# Patient Record
Sex: FEMALE | Race: WHITE | NOT HISPANIC OR LATINO
[De-identification: names, ages, dates, MRNs, and addresses within clinical notes are randomized per-mention and may not be internally consistent; named-entity substitution may affect disease eponyms.]

---

## 2021-04-29 PROBLEM — Z00.00 ENCOUNTER FOR PREVENTIVE HEALTH EXAMINATION: Status: ACTIVE | Noted: 2021-04-29

## 2021-04-30 ENCOUNTER — APPOINTMENT (OUTPATIENT)
Dept: ENDOCRINOLOGY | Facility: CLINIC | Age: 61
End: 2021-04-30

## 2021-05-24 ENCOUNTER — APPOINTMENT (OUTPATIENT)
Dept: ENDOCRINOLOGY | Facility: CLINIC | Age: 61
End: 2021-05-24
Payer: MEDICARE

## 2021-05-24 VITALS
SYSTOLIC BLOOD PRESSURE: 130 MMHG | OXYGEN SATURATION: 97 % | DIASTOLIC BLOOD PRESSURE: 80 MMHG | WEIGHT: 283 LBS | HEIGHT: 64 IN | HEART RATE: 80 BPM | BODY MASS INDEX: 48.32 KG/M2

## 2021-05-24 DIAGNOSIS — Z78.9 OTHER SPECIFIED HEALTH STATUS: ICD-10-CM

## 2021-05-24 DIAGNOSIS — Z83.3 FAMILY HISTORY OF DIABETES MELLITUS: ICD-10-CM

## 2021-05-24 DIAGNOSIS — Z82.49 FAMILY HISTORY OF ISCHEMIC HEART DISEASE AND OTHER DISEASES OF THE CIRCULATORY SYSTEM: ICD-10-CM

## 2021-05-24 DIAGNOSIS — Z80.8 FAMILY HISTORY OF MALIGNANT NEOPLASM OF OTHER ORGANS OR SYSTEMS: ICD-10-CM

## 2021-05-24 LAB — GLUCOSE BLDC GLUCOMTR-MCNC: 330

## 2021-05-24 PROCEDURE — 82962 GLUCOSE BLOOD TEST: CPT

## 2021-05-24 PROCEDURE — 99204 OFFICE O/P NEW MOD 45 MIN: CPT | Mod: 25

## 2021-05-24 PROCEDURE — 99072 ADDL SUPL MATRL&STAF TM PHE: CPT

## 2021-05-24 RX ORDER — ASPIRIN 81 MG/1
81 TABLET ORAL DAILY
Refills: 0 | Status: ACTIVE | COMMUNITY

## 2021-05-24 RX ORDER — UBIDECARENONE/VIT E ACET 100MG-5
1000 CAPSULE ORAL DAILY
Refills: 0 | Status: ACTIVE | COMMUNITY

## 2021-05-24 RX ORDER — METFORMIN HYDROCHLORIDE 1000 MG/1
1000 TABLET, COATED ORAL
Qty: 180 | Refills: 1 | Status: ACTIVE | COMMUNITY

## 2021-05-24 RX ORDER — GLIPIZIDE 5 MG/1
5 TABLET ORAL TWICE DAILY
Refills: 0 | Status: ACTIVE | COMMUNITY

## 2021-05-24 NOTE — REASON FOR VISIT
[Initial Evaluation] : an initial evaluation [DM Type 2] : DM Type 2 [Other___] : [unfilled] [FreeTextEntry2] : Dr. Altamirano

## 2021-05-24 NOTE — PHYSICAL EXAM
[Alert] : alert [Well Nourished] : well nourished [Obese] : obese [No Acute Distress] : no acute distress [Normal Sclera/Conjunctiva] : normal sclera/conjunctiva [EOMI] : extra ocular movement intact [No LAD] : no lymphadenopathy [Thyroid Not Enlarged] : the thyroid was not enlarged [No Thyroid Nodules] : no palpable thyroid nodules [No Respiratory Distress] : no respiratory distress [No Accessory Muscle Use] : no accessory muscle use [Clear to Auscultation] : lungs were clear to auscultation bilaterally [Normal S1, S2] : normal S1 and S2 [Normal Rate] : heart rate was normal [Regular Rhythm] : with a regular rhythm [No Edema] : no peripheral edema [Pedal Pulses Normal] : the pedal pulses are present [Normal Bowel Sounds] : normal bowel sounds [Not Tender] : non-tender [Soft] : abdomen soft [Normal Gait] : normal gait [No Rash] : no rash [Acanthosis Nigricans] : no acanthosis nigricans [Foot Ulcers] : no foot ulcers [Right Foot Was Examined] : right foot ~C was examined [Left Foot Was Examined] : left foot ~C was examined [Normal] : normal [Diminished Throughout Both Feet] : normal tactile sensation with monofilament testing throughout both feet [No Tremors] : no tremors [Oriented x3] : oriented to person, place, and time [Normal Affect] : the affect was normal [Normal Insight/Judgement] : insight and judgment were intact [Normal Mood] : the mood was normal [de-identified] : posterior neck hump [FreeTextEntry1] : 2nd toe anterior lesion due to shaving

## 2021-05-24 NOTE — ASSESSMENT
[FreeTextEntry1] : 59 y/o obese female with uncontrolled diabetes, Hypercalcemia, Hyperlipidemia and HTN. \par \par Plan: \par Type 2 DM: uncontrolled \par - educated on insulin self administration, action and use of insulin along with injection technique, storage, and sharp disposal \par - start Tresiba 14 units daily - first dose given in office by patient\par sample Tresiba pen given lot# [id number] exp 9/2022\par - continue Metformin, Onglyza and Glipizide \par - discussed the use of GLP-1, reviewed s/e, patient will verify what kind of thyroid cancer her mother had before starting medication \par - increase self blood sugar monitoring to 3 times a day\par - send in logs in 1 week \par - schedule appointment with CDE for diet education \par - keep eye appointment \par \par Obesity: encouraged to increase routine exercise \par - educated on healthy  food choices\par \par Hypercalcemia: check PTH, ionized calcium and serum calcium\par \par Hyperlipidemia: continue statin \par \par HTN: BP acceptable, continue ACE-I and diuretic \par \par RTO in 4-6 weeks  [Importance of Diet and Exercise] : importance of diet and exercise to improve glycemic control, achieve weight loss and improve cardiovascular health [Action and use of Insulin] : action and use of short and long-acting insulin [Self Monitoring of Blood Glucose] : self monitoring of blood glucose [Insulin Self-Administration] : insulin self-administration [Injection Technique, Storage, Sharps Disposal] : injection technique, storage, and sharps disposal [Weight Loss] : weight loss [Diabetic Medications] : Risks and benefits of diabetic medications were discussed

## 2021-05-24 NOTE — HISTORY OF PRESENT ILLNESS
[FreeTextEntry1] : Pt. reports A1C has been rising due to stress with . Her  almost lost his leg to diabetes.\par \par Quality: Type 2 DM\par Severity: uncontrolled \par Duration: over 5 years \par Onset: routine labs\par Modifying Factors: Worse with diet \par Associated Symptoms:\par Family History: Mother - diabetes,  thyroid cancer, Brother- diabetes \par \par Current Regimen:\par Metformin 1000 mg 2 tabs daily \par Onglyza 5 mg daily - DPP 4 \par Glipizide 5 mg daily \par \par \par Self blood sugar monitorin time a day, \par per patient fasting 139-348 \par current \par \par exercise: home motion exercises\par \par Diet: at times has fast food and skips meals \par B- corn muffin, cereal\par L- cottage cheese, turkey, fruit\par D- grilled chicken, vegetable, fruits\par Snacks - sugar free cookies \par \par \par Date of last eye exam: 2 years ago (-) DR\par Date of last foot exam: none, denies neuropathy \par Date of last flu vaccine: \par Date of last Pneumovax: last couple of years \par \par Labs: \par 21\par A1C 12.8%\par calcium 11.1\par cholesterol 195\par \par \par PMH: \par Retired \par obesity\par arthritis \par \par PSH: \par neck surgery due to herniated disc 2019\par left breast cyst\par left overay cyst\par

## 2021-05-24 NOTE — REVIEW OF SYSTEMS
[Fatigue] : fatigue [Recent Weight Loss (___ Lbs)] : recent weight loss: [unfilled] lbs [Polyuria] : polyuria [Anxiety] : anxiety [Stress] : stress [Decreased Appetite] : appetite not decreased [Visual Field Defect] : no visual field defect [Blurred Vision] : no blurred vision [Dysphagia] : no dysphagia [Neck Pain] : no neck pain [Dysphonia] : no dysphonia [Chest Pain] : no chest pain [Palpitations] : no palpitations [Constipation] : no constipation [Diarrhea] : no diarrhea [Dysuria] : no dysuria [Headaches] : no headaches [Tremors] : no tremors [Depression] : no depression [Polydipsia] : no polydipsia [Swelling] : no swelling [FreeTextEntry3] : wears glasses

## 2021-05-25 ENCOUNTER — NON-APPOINTMENT (OUTPATIENT)
Age: 61
End: 2021-05-25

## 2021-06-08 ENCOUNTER — APPOINTMENT (OUTPATIENT)
Dept: ENDOCRINOLOGY | Facility: CLINIC | Age: 61
End: 2021-06-08
Payer: MEDICARE

## 2021-06-08 PROCEDURE — G0108 DIAB MANAGE TRN  PER INDIV: CPT

## 2021-06-08 PROCEDURE — 99072 ADDL SUPL MATRL&STAF TM PHE: CPT

## 2021-06-23 ENCOUNTER — APPOINTMENT (OUTPATIENT)
Dept: ENDOCRINOLOGY | Facility: CLINIC | Age: 61
End: 2021-06-23
Payer: MEDICARE

## 2021-06-23 PROCEDURE — 99072 ADDL SUPL MATRL&STAF TM PHE: CPT

## 2021-06-23 PROCEDURE — G0108 DIAB MANAGE TRN  PER INDIV: CPT

## 2021-06-30 ENCOUNTER — APPOINTMENT (OUTPATIENT)
Dept: ENDOCRINOLOGY | Facility: CLINIC | Age: 61
End: 2021-06-30
Payer: MEDICARE

## 2021-06-30 VITALS
SYSTOLIC BLOOD PRESSURE: 110 MMHG | DIASTOLIC BLOOD PRESSURE: 80 MMHG | HEIGHT: 64 IN | HEART RATE: 74 BPM | WEIGHT: 293 LBS | BODY MASS INDEX: 50.02 KG/M2 | OXYGEN SATURATION: 99 %

## 2021-06-30 LAB — GLUCOSE BLDC GLUCOMTR-MCNC: 144

## 2021-06-30 PROCEDURE — 82962 GLUCOSE BLOOD TEST: CPT

## 2021-06-30 PROCEDURE — 99072 ADDL SUPL MATRL&STAF TM PHE: CPT

## 2021-06-30 PROCEDURE — 99215 OFFICE O/P EST HI 40 MIN: CPT | Mod: 25

## 2021-06-30 NOTE — PHYSICAL EXAM
[Alert] : alert [Well Nourished] : well nourished [No Acute Distress] : no acute distress [Well Developed] : well developed [Normal Sclera/Conjunctiva] : normal sclera/conjunctiva [EOMI] : extra ocular movement intact [No LAD] : no lymphadenopathy [Thyroid Not Enlarged] : the thyroid was not enlarged [No Thyroid Nodules] : no palpable thyroid nodules [No Respiratory Distress] : no respiratory distress [No Accessory Muscle Use] : no accessory muscle use [Normal Rate and Effort] : normal respiratory rate and effort [Clear to Auscultation] : lungs were clear to auscultation bilaterally [Normal S1, S2] : normal S1 and S2 [Normal Rate] : heart rate was normal [Regular Rhythm] : with a regular rhythm [No Edema] : no peripheral edema [Normal Bowel Sounds] : normal bowel sounds [Not Tender] : non-tender [Soft] : abdomen soft [Normal Gait] : normal gait [No Rash] : no rash [Acanthosis Nigricans] : no acanthosis nigricans [No Tremors] : no tremors [Oriented x3] : oriented to person, place, and time [Normal Affect] : the affect was normal [Normal Insight/Judgement] : insight and judgment were intact [Normal Mood] : the mood was normal [de-identified] :  posterior neck hump.

## 2021-06-30 NOTE — REVIEW OF SYSTEMS
[Headaches] : headaches [Stress] : stress [Fatigue] : no fatigue [Decreased Appetite] : appetite not decreased [Recent Weight Gain (___ Lbs)] : no recent weight gain [Recent Weight Loss (___ Lbs)] : no recent weight loss [Visual Field Defect] : no visual field defect [Blurred Vision] : no blurred vision [Dysphagia] : no dysphagia [Neck Pain] : no neck pain [Dysphonia] : no dysphonia [Chest Pain] : no chest pain [Palpitations] : no palpitations [Constipation] : no constipation [Diarrhea] : no diarrhea [Polyuria] : no polyuria [Dysuria] : no dysuria [Tremors] : no tremors [Depression] : no depression [Anxiety] : no anxiety [Polydipsia] : no polydipsia [FreeTextEntry2] : stable

## 2021-06-30 NOTE — HISTORY OF PRESENT ILLNESS
[FreeTextEntry1] : Pt. reports A1C has been rising due to stress with . Her  almost lost his leg to diabetes.\par \par Quality: Type 2 DM\par Severity: uncontrolled \par Duration: over 5 years \par Onset: routine labs\par Modifying Factors: Worse with diet \par Associated Symptoms:\par Family History: Mother - diabetes,  thyroid cancer, Brother- diabetes \par \par Current Regimen:\par Metformin 1000 mg 2 tabs daily \par Onglyza 5 mg daily - DPP 4 \par Glipizide 5 mg BID \par Tresiba 18 units in am\par \par \par Self blood sugar monitorin times a day, \par per logs \par fasting 129, 107, 108, 101, 118, 93, 81\par bedtime: 118, 105, 128, 138, 128, 94\par current \par \par exercise: home motion exercises\par \par Diet: no more fast food\par B- corn muffin, cereal\par L- cottage cheese, turkey, fruit\par D- grilled chicken, vegetable, fruits\par Snacks - sugar free cookies \par \par \par Date of last eye exam: 2 years ago (-) DR\par Date of last foot exam: none, denies neuropathy \par Date of last flu vaccine: \par Date of last Pneumovax: last couple of years \par \par Labs: \par 21\par A1C 12.8%\par calcium 11.1\par cholesterol 195\par \par \par PMH: \par Retired \par obesity\par arthritis \par \par PSH: \par neck surgery due to herniated disc 2019\par left breast cyst\par left overay cyst\par

## 2021-06-30 NOTE — ASSESSMENT
[FreeTextEntry1] : 61 y/o obese female with uncontrolled diabetes, Hypercalcemia, Hyperlipidemia and HTN. \par \par Plan: \par Type 2 DM: blood sugars are improving with insulin \par - continue current Rx \par - discussed the use of GLP-1, reviewed s/e, patient will verify what kind of thyroid cancer her mother had before starting medication \par - increase self blood sugar monitoring to 3 times a day\par - send in logs in 1 week \par - keep eye appointment \par - awaiting recent A1C from PMD \par \par Obesity: encouraged to increase routine exercise \par - educated on healthy food choices\par \par Hypercalcemia: recently stopped diuretic due to elevated calcium, will recheck PTH, ionized calcium and serum calcium along with 24 hour urine calcium and creatinine, if calcium is still elevated will have renal sonogram preformed  \par \par Osteopenia: after labs are done for calcium and 24 hour urine, then will recommend starting Fosamax 70 mg once a week\par \par Hyperlipidemia: continue statin \par \par HTN: BP acceptable, continue current medication regimen  \par \par RTO in 6 weeks

## 2021-07-08 ENCOUNTER — NON-APPOINTMENT (OUTPATIENT)
Age: 61
End: 2021-07-08

## 2021-07-21 ENCOUNTER — APPOINTMENT (OUTPATIENT)
Dept: ENDOCRINOLOGY | Facility: CLINIC | Age: 61
End: 2021-07-21

## 2021-07-23 ENCOUNTER — RX RENEWAL (OUTPATIENT)
Age: 61
End: 2021-07-23

## 2021-07-30 RX ORDER — LANCETS 33 GAUGE
EACH MISCELLANEOUS
Qty: 300 | Refills: 0 | Status: ACTIVE | COMMUNITY
Start: 2021-06-01 | End: 1900-01-01

## 2021-08-24 ENCOUNTER — APPOINTMENT (OUTPATIENT)
Dept: ENDOCRINOLOGY | Facility: CLINIC | Age: 61
End: 2021-08-24
Payer: MEDICARE

## 2021-08-24 VITALS
SYSTOLIC BLOOD PRESSURE: 118 MMHG | HEIGHT: 72 IN | DIASTOLIC BLOOD PRESSURE: 80 MMHG | BODY MASS INDEX: 39.01 KG/M2 | OXYGEN SATURATION: 97 % | HEART RATE: 66 BPM | WEIGHT: 288 LBS

## 2021-08-24 LAB — GLUCOSE BLDC GLUCOMTR-MCNC: 133

## 2021-08-24 PROCEDURE — 99215 OFFICE O/P EST HI 40 MIN: CPT | Mod: 25

## 2021-08-24 PROCEDURE — 82962 GLUCOSE BLOOD TEST: CPT

## 2021-08-24 NOTE — ASSESSMENT
[FreeTextEntry1] : 61 y/o obese female with uncontrolled diabetes, Hypercalcemia, Hyperlipidemia and HTN. \par \par Plan: \par Type 2 DM: blood sugars are improving with insulin \par - continue current Rx \par - discussed the use of GLP-1, reviewed s/e, patient will verify what kind of thyroid cancer her mother had before starting medication \par - continue self blood sugar monitoring to 3 times a day\par - send in logs in 1 week \par - repeat A1c before next office visit \par \par Obesity: encouraged to increase routine exercise \par - educated on healthy food choices\par \par Hypercalcemia:  PTH and ionized calcium are elevated. serum calcium along with 24 hour urine calcium and creatinine was normal. check Parathyroid scan\par - discussed the possibility of surgery, pt. is undecided if she would have the surgery or not \par \par Osteopenia: recommend starting Fosamax 70 mg once a week, pt. is considering \par \par Hyperlipidemia: continue statin \par \par HTN: BP acceptable, continue current medication regimen  \par \par RTO in 4-6 weeks

## 2021-08-24 NOTE — REASON FOR VISIT
[Follow - Up] : a follow-up visit [DM Type 2] : DM Type 2 [Hyperparathyroidism] : hyperparathyroidism [Hypercalcemia] : hypercalcemia

## 2021-08-24 NOTE — PHYSICAL EXAM
[Alert] : alert [Well Nourished] : well nourished [Obese] : obese [No Acute Distress] : no acute distress [Well Developed] : well developed [Normal Sclera/Conjunctiva] : normal sclera/conjunctiva [EOMI] : extra ocular movement intact [No LAD] : no lymphadenopathy [Thyroid Not Enlarged] : the thyroid was not enlarged [No Thyroid Nodules] : no palpable thyroid nodules [No Respiratory Distress] : no respiratory distress [No Accessory Muscle Use] : no accessory muscle use [Normal Rate and Effort] : normal respiratory rate and effort [Clear to Auscultation] : lungs were clear to auscultation bilaterally [Normal S1, S2] : normal S1 and S2 [Normal Rate] : heart rate was normal [Regular Rhythm] : with a regular rhythm [No Edema] : no peripheral edema [Normal Bowel Sounds] : normal bowel sounds [Not Tender] : non-tender [Soft] : abdomen soft [No Rash] : no rash [Acanthosis Nigricans] : no acanthosis nigricans [No Tremors] : no tremors [Oriented x3] : oriented to person, place, and time [Normal Affect] : the affect was normal [Normal Insight/Judgement] : insight and judgment were intact [Normal Mood] : the mood was normal

## 2021-09-01 ENCOUNTER — NON-APPOINTMENT (OUTPATIENT)
Age: 61
End: 2021-09-01

## 2021-09-28 ENCOUNTER — RX RENEWAL (OUTPATIENT)
Age: 61
End: 2021-09-28

## 2021-09-28 RX ORDER — AMLODIPINE BESYLATE 5 MG/1
5 TABLET ORAL
Qty: 90 | Refills: 0 | Status: ACTIVE | COMMUNITY
Start: 2021-06-11 | End: 1900-01-01

## 2021-12-30 ENCOUNTER — APPOINTMENT (OUTPATIENT)
Dept: ENDOCRINOLOGY | Facility: CLINIC | Age: 61
End: 2021-12-30

## 2022-01-14 ENCOUNTER — RX RENEWAL (OUTPATIENT)
Age: 62
End: 2022-01-14

## 2022-02-14 ENCOUNTER — RX RENEWAL (OUTPATIENT)
Age: 62
End: 2022-02-14

## 2022-02-14 RX ORDER — BLOOD SUGAR DIAGNOSTIC
STRIP MISCELLANEOUS
Qty: 300 | Refills: 3 | Status: ACTIVE | COMMUNITY
Start: 2021-06-01 | End: 1900-01-01

## 2022-02-14 RX ORDER — LANCETS 33 GAUGE
EACH MISCELLANEOUS
Qty: 300 | Refills: 3 | Status: ACTIVE | COMMUNITY
Start: 2021-09-28 | End: 1900-01-01

## 2022-02-23 LAB
HBA1C MFR BLD HPLC: 7.3
LDLC SERPL CALC-MCNC: 99
MICROALBUMIN/CREAT 24H UR-RTO: 76

## 2022-03-22 DIAGNOSIS — E11.65 TYPE 2 DIABETES MELLITUS WITH HYPERGLYCEMIA: ICD-10-CM

## 2022-04-07 RX ORDER — LISINOPRIL 10 MG/1
10 TABLET ORAL DAILY
Refills: 0 | Status: DISCONTINUED | COMMUNITY
End: 2022-04-07

## 2022-04-26 LAB
HBA1C MFR BLD HPLC: 7.6
LDLC SERPL DIRECT ASSAY-MCNC: 115
MICROALBUMIN/CREAT 24H UR-RTO: 113

## 2022-04-27 ENCOUNTER — APPOINTMENT (OUTPATIENT)
Dept: ENDOCRINOLOGY | Facility: CLINIC | Age: 62
End: 2022-04-27
Payer: MEDICARE

## 2022-04-27 VITALS
SYSTOLIC BLOOD PRESSURE: 130 MMHG | WEIGHT: 293 LBS | DIASTOLIC BLOOD PRESSURE: 74 MMHG | HEART RATE: 78 BPM | HEIGHT: 64 IN | BODY MASS INDEX: 50.02 KG/M2

## 2022-04-27 LAB — GLUCOSE BLDC GLUCOMTR-MCNC: 169

## 2022-04-27 PROCEDURE — 99215 OFFICE O/P EST HI 40 MIN: CPT | Mod: 25

## 2022-04-27 PROCEDURE — 82962 GLUCOSE BLOOD TEST: CPT

## 2022-04-27 NOTE — HISTORY OF PRESENT ILLNESS
[FreeTextEntry1] : Pt. reports A1C has been rising due to stress with . Her  almost lost his leg to diabetes.\par she agrees to taking a GLP 1 agonist. speaks about how she overeats when stressed out, reports that mother has some form of thyroid cancer, denies pancreatitis \par we continue to recommend parathyroid scan due to high calcium, states that she is taking b12 to help with calcium \par states that she is willing to accept fosamax for osteopenia \par \par \par denies being in the hospital or having any acute illness. \par Quality: Type 2 DM\par Severity: uncontrolled \par Duration: over 5 years \par Onset: routine labs\par Modifying Factors: Worse with diet \par Associated Symptoms:\par Family History: Mother - diabetes,  thyroid cancer, Brother- diabetes \par \par Current Regimen:\par Metformin 1000 mg 2 tabs daily \par Onglyza 5 mg daily - DPP 4 \par Glipizide 5 mg BID \par Tresiba 18 units in am\par \par \par Self blood sugar monitorin times a day, \par per patient \par fasting 107, 115, 114, 137, 120, 134, 136, 128\par current  (ate a corn muffin this morning) \par \par exercise: home motion exercises\par \par Diet: no more fast food\par B- corn muffin, cereal\par L- cottage cheese, turkey, fruit\par D- grilled chicken, vegetable, fruits\par Snacks - sugar free cookies \par \par \par Date of last eye exam: 2021 (-) , going again in 2022\par Date of last foot exam: 2022 with podiatry, denies neuropathy \par Date of last flu vaccine: \par Date of last Pneumovax: last couple of years \par \par Labs: \par 3/23/22\par Microalbuminuria 113\par A1C 7.6%\par Cholesterol 183\par \par Vitamin B12 172\par Calcium 11.2\par PTH 71\par Ionized Calcium 5.9\par Vitamin D 50.7\par \par 21\par A1C 12.8%\par calcium 11.1\par cholesterol 195\par \par \par PMH: \par Retired \par obesity\par arthritis \par \par PSH: \par neck surgery due to herniated disc 2019\par left breast cyst\par left ovary cyst\par

## 2022-04-27 NOTE — ASSESSMENT
[FreeTextEntry1] : 60 y/o obese female with uncontrolled diabetes, Hypercalcemia, Hyperlipidemia and HTN. \par \par Plan: \par Type 2 DM: A1C worse \par - start Trulicity 0.75 mg weekly -  reviewed s/e, denies hx of pancreatitis and/or Thyroid Medullary Cancer, Pt. mother did have thyroid cancer however unsure of the type \par - continue self blood sugar monitoring to 3 times a day\par - send in logs in 1 week \par - repeat A1c before next office visit \par - given trulicity sample [id number] exp 12-2-23 trulicity .75mg, pt. gave herself first injection in office\par \par Obesity: encouraged to increase routine exercise \par - educated on healthy food choices\par \par Hypercalcemia:  PTH and ionized calcium are elevated. serum calcium along with 24 hour urine calcium and creatinine was normal. check Parathyroid scan now \par - Discussed surgery, pt. declines having surgery \par \par Osteopenia: repeat DEXA with forearm first then will start Fosamax 70 mg once a week\par \par Hyperlipidemia: continue statin \par - repeat lipids fasting before next visit \par \par HTN: BP acceptable, continue current medication regimen  \par \par RTO in 3 months with Dr. Recinos

## 2022-04-27 NOTE — REVIEW OF SYSTEMS
[Neck Pain] : neck pain [Negative] : Heme/Lymph [Fatigue] : no fatigue [Decreased Appetite] : appetite not decreased [Recent Weight Gain (___ Lbs)] : no recent weight gain [Recent Weight Loss (___ Lbs)] : no recent weight loss [Fever] : no fever [Chills] : no chills [FreeTextEntry4] : post nasal drip

## 2022-04-27 NOTE — PHYSICAL EXAM
[Alert] : alert [Well Nourished] : well nourished [Obese] : obese [No Acute Distress] : no acute distress [Well Developed] : well developed [Normal Sclera/Conjunctiva] : normal sclera/conjunctiva [No LAD] : no lymphadenopathy [Thyroid Not Enlarged] : the thyroid was not enlarged [No Thyroid Nodules] : no palpable thyroid nodules [No Respiratory Distress] : no respiratory distress [No Accessory Muscle Use] : no accessory muscle use [Normal Rate and Effort] : normal respiratory rate and effort [Clear to Auscultation] : lungs were clear to auscultation bilaterally [Normal S1, S2] : normal S1 and S2 [Normal Rate] : heart rate was normal [Regular Rhythm] : with a regular rhythm [No Edema] : no peripheral edema [Normal Bowel Sounds] : normal bowel sounds [Not Tender] : non-tender [Soft] : abdomen soft [Normal Gait] : normal gait [No Rash] : no rash [No Tremors] : no tremors [Oriented x3] : oriented to person, place, and time [Normal Affect] : the affect was normal [Normal Insight/Judgement] : insight and judgment were intact [Normal Mood] : the mood was normal [No Proptosis] : no proptosis [Acanthosis Nigricans] : no acanthosis nigricans

## 2022-04-28 RX ORDER — DULAGLUTIDE 0.75 MG/.5ML
0.75 INJECTION, SOLUTION SUBCUTANEOUS
Qty: 3 | Refills: 1 | Status: DISCONTINUED | COMMUNITY
Start: 2022-04-27 | End: 2022-04-28

## 2022-06-20 ENCOUNTER — NON-APPOINTMENT (OUTPATIENT)
Age: 62
End: 2022-06-20

## 2022-07-20 ENCOUNTER — APPOINTMENT (OUTPATIENT)
Dept: ENDOCRINOLOGY | Facility: CLINIC | Age: 62
End: 2022-07-20

## 2022-08-10 ENCOUNTER — RX RENEWAL (OUTPATIENT)
Age: 62
End: 2022-08-10

## 2022-10-10 NOTE — HISTORY OF PRESENT ILLNESS
Bed: 11  Expected date: 10/9/22  Expected time: 7:59 PM  Means of arrival: [facility]  Comments:  Psych eval   [FreeTextEntry1] : Pt. reports A1C has been rising due to stress with . Her  almost lost his leg to diabetes.\par \par Quality: Type 2 DM\par Severity: uncontrolled \par Duration: over 5 years \par Onset: routine labs\par Modifying Factors: Worse with diet \par Associated Symptoms:\par Family History: Mother - diabetes,  thyroid cancer, Brother- diabetes \par \par Current Regimen:\par Metformin 1000 mg 2 tabs daily \par Onglyza 5 mg daily - DPP 4 \par Glipizide 5 mg BID \par Tresiba 18 units in am\par \par \par Self blood sugar monitorin times a day, \par per logs \par fasting 112, 114, 77, 116, 107, 97, 124, 156\par 3 hours after eatin, 120, 108, 157\par bedtime: 119, 162, 116, 142, 120, 154, 124\par current \par \par exercise: home motion exercises\par \par Diet: no more fast food\par B- corn muffin, cereal\par L- cottage cheese, turkey, fruit\par D- grilled chicken, vegetable, fruits\par Snacks - sugar free cookies \par \par \par Date of last eye exam: 2021 (-) DR\par Date of last foot exam: 2021 with podiatry, denies neuropathy \par Date of last flu vaccine: \par Date of last Pneumovax: last couple of years \par \par Labs: \par 21\par A1C 12.8%\par calcium 11.1\par cholesterol 195\par \par \par PMH: \par Retired \par obesity\par arthritis \par \par PSH: \par neck surgery due to herniated disc 2019\par left breast cyst\par left overay cyst\par

## 2022-10-24 ENCOUNTER — APPOINTMENT (OUTPATIENT)
Dept: ENDOCRINOLOGY | Facility: CLINIC | Age: 62
End: 2022-10-24

## 2022-10-24 VITALS
HEART RATE: 82 BPM | WEIGHT: 293 LBS | SYSTOLIC BLOOD PRESSURE: 126 MMHG | DIASTOLIC BLOOD PRESSURE: 70 MMHG | HEIGHT: 64 IN | BODY MASS INDEX: 50.02 KG/M2

## 2022-10-24 LAB
GLUCOSE BLDC GLUCOMTR-MCNC: 206
HBA1C MFR BLD HPLC: 11

## 2022-10-24 PROCEDURE — 99215 OFFICE O/P EST HI 40 MIN: CPT | Mod: 25

## 2022-10-24 PROCEDURE — 82962 GLUCOSE BLOOD TEST: CPT

## 2022-10-24 PROCEDURE — 98960 EDU&TRN PT SELF-MGMT NQHP 1: CPT

## 2022-10-24 PROCEDURE — 83036 HEMOGLOBIN GLYCOSYLATED A1C: CPT | Mod: QW

## 2022-10-24 RX ORDER — NITROFURANTOIN (MONOHYDRATE/MACROCRYSTALS) 25; 75 MG/1; MG/1
100 CAPSULE ORAL
Qty: 14 | Refills: 0 | Status: DISCONTINUED | COMMUNITY
Start: 2022-10-05

## 2022-10-24 RX ORDER — SULFAMETHOXAZOLE AND TRIMETHOPRIM 800; 160 MG/1; MG/1
800-160 TABLET ORAL
Qty: 14 | Refills: 0 | Status: DISCONTINUED | COMMUNITY
Start: 2022-08-22

## 2022-10-24 RX ORDER — PEN NEEDLE, DIABETIC 29 G X1/2"
32G X 4 MM NEEDLE, DISPOSABLE MISCELLANEOUS
Qty: 100 | Refills: 1 | Status: DISCONTINUED | COMMUNITY
Start: 2021-05-24 | End: 2022-10-24

## 2022-10-24 RX ORDER — FLUTICASONE PROPIONATE 50 UG/1
50 SPRAY, METERED NASAL
Qty: 16 | Refills: 0 | Status: ACTIVE | COMMUNITY
Start: 2022-06-29

## 2022-10-24 RX ORDER — PROMETHAZINE HYDROCHLORIDE AND DEXTROMETHORPHAN HYDROBROMIDE ORAL SOLUTION 15; 6.25 MG/5ML; MG/5ML
6.25-15 SOLUTION ORAL
Qty: 140 | Refills: 0 | Status: DISCONTINUED | COMMUNITY
Start: 2022-10-05

## 2022-10-24 RX ORDER — SEMAGLUTIDE 1.34 MG/ML
2 INJECTION, SOLUTION SUBCUTANEOUS
Qty: 3 | Refills: 1 | Status: DISCONTINUED | COMMUNITY
Start: 2022-04-28 | End: 2022-10-24

## 2022-10-24 RX ORDER — CIPROFLOXACIN HYDROCHLORIDE 500 MG/1
500 TABLET, FILM COATED ORAL
Qty: 14 | Refills: 0 | Status: DISCONTINUED | COMMUNITY
Start: 2022-05-02

## 2022-10-24 RX ORDER — SAXAGLIPTIN 5 MG/1
5 TABLET, FILM COATED ORAL DAILY
Refills: 0 | Status: DISCONTINUED | COMMUNITY
End: 2022-10-24

## 2022-10-24 NOTE — PHYSICAL EXAM
[Alert] : alert [Obese] : obese [No Acute Distress] : no acute distress [Normal Sclera/Conjunctiva] : normal sclera/conjunctiva [No Proptosis] : no proptosis [No LAD] : no lymphadenopathy [Thyroid Not Enlarged] : the thyroid was not enlarged [No Thyroid Nodules] : no palpable thyroid nodules [No Respiratory Distress] : no respiratory distress [No Accessory Muscle Use] : no accessory muscle use [Normal Rate and Effort] : normal respiratory rate and effort [Clear to Auscultation] : lungs were clear to auscultation bilaterally [Normal S1, S2] : normal S1 and S2 [Normal Rate] : heart rate was normal [Regular Rhythm] : with a regular rhythm [No Edema] : no peripheral edema [Normal Bowel Sounds] : normal bowel sounds [Not Tender] : non-tender [Soft] : abdomen soft [Normal Gait] : normal gait [No Rash] : no rash [No Tremors] : no tremors [Oriented x3] : oriented to person, place, and time [Normal Affect] : the affect was normal [Normal Insight/Judgement] : insight and judgment were intact [Normal Mood] : the mood was normal [Acanthosis Nigricans] : no acanthosis nigricans

## 2022-10-24 NOTE — HISTORY OF PRESENT ILLNESS
[FreeTextEntry1] : History: Pt. reports A1C has been rising due to stress with . Her  almost lost his leg to diabetes.\par she agrees to taking a GLP 1 agonist. speaks about how she overeats when stressed out, reports that mother has some form of thyroid cancer, denies pancreatitis \par we continue to recommend parathyroid scan due to high calcium, states that she is taking b12 to help with calcium \par states that she is willing to accept fosamax for osteopenia \par \par Interval History: since last visit she had five UTIs.  She reports high blood sugars. \par \par \par denies being in the hospital or having any acute illness. \par Quality: Type 2 DM\par Severity: uncontrolled \par Duration: over 5 years \par Onset: routine labs\par Modifying Factors: Worse with diet \par Associated Symptoms:\par Family History: Mother - diabetes,  thyroid cancer, Brother- diabetes \par \par Current Regimen:\par Metformin 1000 mg 2 tabs daily \par Onglyza 5 mg daily - DPP 4, stopped once started on Ozempic \par Glipizide 5 mg BID \par Tresiba 18 units in am\par Ozempic 0.5 mg weekly - discontinued due to symptoms, vomiting? \par \par \par Self blood sugar monitorin times a day, \par per patient \par fasting over 200s\par current \par \par exercise: home motion exercises\par \par Diet: no more fast food\par B- corn muffin, cereal\par L- cottage cheese, turkey, fruit\par D- grilled chicken, vegetable, fruits\par Snacks - sugar free cookies \par \par \par Date of last eye exam: 2021 (-) , appointment 22\par Date of last foot exam: 2022 with podiatry, denies neuropathy \par Date of last flu vaccine: \par Date of last Pneumovax: last couple of years \par Seen Cardiology - no heart disease\par \par Labs: \par A1c 11% today in office\par \par Vitamin D: takes 5,000 units daily \par Vitamin B12: takes 1000 units daily \par \par PMH: \par Retired \par obesity\par arthritis \par \par PSH: \par neck surgery due to herniated disc 2019\par left breast cyst\par left ovary cyst\par

## 2022-10-24 NOTE — REVIEW OF SYSTEMS
[Fatigue] : no fatigue [Decreased Appetite] : appetite not decreased [Recent Weight Gain (___ Lbs)] : no recent weight gain [Recent Weight Loss (___ Lbs)] : no recent weight loss [Visual Field Defect] : no visual field defect [Blurred Vision] : no blurred vision [Dysphagia] : no dysphagia [Neck Pain] : no neck pain [Dysphonia] : no dysphonia [Chest Pain] : no chest pain [Palpitations] : no palpitations [Constipation] : no constipation [Diarrhea] : no diarrhea [Polyuria] : no polyuria [Dysuria] : no dysuria [Headaches] : no headaches [Tremors] : no tremors [Depression] : no depression [Anxiety] : no anxiety [Polydipsia] : no polydipsia [Swelling] : no swelling [FreeTextEntry2] : weight stable

## 2022-11-10 ENCOUNTER — NON-APPOINTMENT (OUTPATIENT)
Age: 62
End: 2022-11-10

## 2022-11-14 ENCOUNTER — RX RENEWAL (OUTPATIENT)
Age: 62
End: 2022-11-14

## 2022-11-28 LAB
LDLC SERPL DIRECT ASSAY-MCNC: 124
MICROALBUMIN/CREAT 24H UR-RTO: 82

## 2023-01-02 ENCOUNTER — RX RENEWAL (OUTPATIENT)
Age: 63
End: 2023-01-02

## 2023-01-03 LAB
HBA1C MFR BLD HPLC: 8.4
LDLC SERPL DIRECT ASSAY-MCNC: 122
MICROALBUMIN/CREAT 24H UR-RTO: 100
TSH SERPL-ACNC: 1.67

## 2023-01-04 ENCOUNTER — APPOINTMENT (OUTPATIENT)
Dept: ENDOCRINOLOGY | Facility: CLINIC | Age: 63
End: 2023-01-04
Payer: MEDICARE

## 2023-01-04 VITALS
HEART RATE: 80 BPM | DIASTOLIC BLOOD PRESSURE: 78 MMHG | HEIGHT: 64 IN | WEIGHT: 290 LBS | BODY MASS INDEX: 49.51 KG/M2 | SYSTOLIC BLOOD PRESSURE: 134 MMHG | OXYGEN SATURATION: 99 %

## 2023-01-04 DIAGNOSIS — M85.80 OTHER SPECIFIED DISORDERS OF BONE DENSITY AND STRUCTURE, UNSPECIFIED SITE: ICD-10-CM

## 2023-01-04 LAB — GLUCOSE BLDC GLUCOMTR-MCNC: 123

## 2023-01-04 PROCEDURE — 99215 OFFICE O/P EST HI 40 MIN: CPT | Mod: 25

## 2023-01-04 PROCEDURE — 82962 GLUCOSE BLOOD TEST: CPT

## 2023-01-04 NOTE — HISTORY OF PRESENT ILLNESS
[FreeTextEntry1] : History: Pt. reports A1C has been rising due to stress with . Her  almost lost his leg to diabetes.\par she agrees to taking a GLP 1 agonist. speaks about how she overeats when stressed out, reports that mother has some form of thyroid cancer, denies pancreatitis \par we continue to recommend parathyroid scan due to high calcium, states that she is taking b12 to help with calcium \par states that she is willing to accept fosamax for osteopenia \par \par Interval History: Blood sugars improving on Mounjaro\par \par \par denies being in the hospital or having any acute illness. \par Quality: Type 2 DM\par Severity: uncontrolled \par Duration: over 5 years \par Onset: routine labs\par Modifying Factors: Worse with diet \par Family History: Mother - diabetes,  thyroid cancer, Brother- diabetes \par \par Current Regimen:\par Metformin 1000 mg 2 tabs daily \par Glipizide 5 mg BID \par Tresiba 15 units in am\par Mounjaro 5 mg weekly\par \par Ozempic 0.5 mg weekly - discontinued due to symptoms, vomiting \par \par \par Self blood sugar monitorin times a day, \par per logs\par fastin, 108, 119, 125, 99, 88\par 3 am: 130, 148, 135, 114, 83\par current \par \par exercise: home motion exercises\par \par Weight: loss 7 pounds \par \par Diet: no more fast food\par B- corn muffin, cereal\par L- cottage cheese, turkey, fruit\par D- grilled chicken, vegetable, fruits\par Snacks - sugar free cookies \par \par \par Date of last eye exam: 2021 (-) , needs appointment \par Date of last foot exam:  with podiatry, denies neuropathy \par Date of last flu vaccine: \par Date of last Pneumovax: last couple of years \par Seen Cardiology - no heart disease\par \par Labs: \par A1c 8.4%\par \par Vitamin D: takes 5,000 units daily \par Vitamin B12: takes 1000 units daily \par \par PMH: \par Retired \par obesity\par arthritis \par \par PSH: \par neck surgery due to herniated disc 2019\par left breast cyst\par left ovary cyst\par

## 2023-01-04 NOTE — ASSESSMENT
[FreeTextEntry1] : 61 y/o obese female with uncontrolled type 2 diabetes, Hypercalcemia, Hyperlipidemia, HTN and Thyroid nodules. \par \par Plan: \par Type 2 DM: suboptimal control, A1c improving\par - increase Mounjaro to 7.5 mg \par - continue metformin, glipizide, tresiba \par - continue self blood sugar monitoring to 3 times a day\par - send in logs in 1 week \par - repeat A1c before next visit\par \par Obesity: encouraged to increase routine exercise \par - educated on healthy food choices\par - continue Mounjaro\par \par Hypercalcemia:  PTH and ionized calcium are elevated. serum calcium along with 24 hour urine calcium and creatinine was normal. check Parathyroid scan patient declined test due to not covered by insurance, continue to Sensipar\par - Discussed surgery, pt. declines having surgery \par \par Osteopenia: DEXA - Osteopenia, continue vitamin D, may benefit from starting Fosamax \par next DEXA due 6/2024\par \par Hyperlipidemia: continue statin \par - repeat lipids fasting before next visit \par \par HTN: BP acceptable, continue current medication regimen  \par \par Thyroid nodules: stable thyroid nodules, repeat thyroid sonogram now\par \par RTO in 3 months with Dr. Recinos

## 2023-01-04 NOTE — PHYSICAL EXAM
[Alert] : alert [Obese] : obese [No Acute Distress] : no acute distress [Normal Sclera/Conjunctiva] : normal sclera/conjunctiva [No Proptosis] : no proptosis [Normal Hearing] : hearing was normal [No LAD] : no lymphadenopathy [Thyroid Not Enlarged] : the thyroid was not enlarged [No Thyroid Nodules] : no palpable thyroid nodules [No Respiratory Distress] : no respiratory distress [No Accessory Muscle Use] : no accessory muscle use [Normal Rate and Effort] : normal respiratory rate and effort [Clear to Auscultation] : lungs were clear to auscultation bilaterally [Normal S1, S2] : normal S1 and S2 [Normal Rate] : heart rate was normal [Regular Rhythm] : with a regular rhythm [No Edema] : no peripheral edema [Normal Bowel Sounds] : normal bowel sounds [Not Tender] : non-tender [Soft] : abdomen soft [Normal Gait] : normal gait [No Rash] : no rash [No Tremors] : no tremors [Oriented x3] : oriented to person, place, and time [Normal Affect] : the affect was normal [Normal Insight/Judgement] : insight and judgment were intact [Normal Mood] : the mood was normal [Acanthosis Nigricans] : no acanthosis nigricans [de-identified] : Pt. declined foot exam

## 2023-01-04 NOTE — REVIEW OF SYSTEMS
[Decreased Appetite] : decreased appetite [Recent Weight Loss (___ Lbs)] : recent weight loss: [unfilled] lbs [Headaches] : headaches [Fatigue] : no fatigue [Visual Field Defect] : no visual field defect [Blurred Vision] : no blurred vision [Dysphagia] : no dysphagia [Neck Pain] : no neck pain [Dysphonia] : no dysphonia [Chest Pain] : no chest pain [Palpitations] : no palpitations [Constipation] : no constipation [Diarrhea] : no diarrhea [Polyuria] : no polyuria [Dysuria] : no dysuria [Tremors] : no tremors [Depression] : no depression [Anxiety] : no anxiety [Polydipsia] : no polydipsia [Swelling] : no swelling [FreeTextEntry2] : with the medication  [de-identified] : with the rainy weather

## 2023-01-09 LAB — TSH SERPL-ACNC: 3.95

## 2023-01-18 ENCOUNTER — NON-APPOINTMENT (OUTPATIENT)
Age: 63
End: 2023-01-18

## 2023-02-27 ENCOUNTER — RX RENEWAL (OUTPATIENT)
Age: 63
End: 2023-02-27

## 2023-03-15 ENCOUNTER — APPOINTMENT (OUTPATIENT)
Dept: ENDOCRINOLOGY | Facility: CLINIC | Age: 63
End: 2023-03-15

## 2023-03-27 LAB
HBA1C MFR BLD HPLC: 6.4
LDLC SERPL DIRECT ASSAY-MCNC: 115
MICROALBUMIN/CREAT 24H UR-RTO: 61

## 2023-03-28 ENCOUNTER — RX RENEWAL (OUTPATIENT)
Age: 63
End: 2023-03-28

## 2023-03-28 ENCOUNTER — APPOINTMENT (OUTPATIENT)
Dept: ENDOCRINOLOGY | Facility: CLINIC | Age: 63
End: 2023-03-28
Payer: MEDICARE

## 2023-03-28 VITALS
DIASTOLIC BLOOD PRESSURE: 64 MMHG | HEART RATE: 95 BPM | HEIGHT: 64 IN | BODY MASS INDEX: 47.29 KG/M2 | OXYGEN SATURATION: 98 % | WEIGHT: 277 LBS | SYSTOLIC BLOOD PRESSURE: 132 MMHG

## 2023-03-28 DIAGNOSIS — E55.9 VITAMIN D DEFICIENCY, UNSPECIFIED: ICD-10-CM

## 2023-03-28 LAB — GLUCOSE BLDC GLUCOMTR-MCNC: 143

## 2023-03-28 PROCEDURE — 99215 OFFICE O/P EST HI 40 MIN: CPT | Mod: 25

## 2023-03-28 PROCEDURE — 82962 GLUCOSE BLOOD TEST: CPT

## 2023-03-28 RX ORDER — LISINOPRIL 5 MG/1
5 TABLET ORAL DAILY
Qty: 1 | Refills: 1 | Status: ACTIVE | COMMUNITY
Start: 2023-03-28 | End: 1900-01-01

## 2023-03-28 RX ORDER — DULAGLUTIDE 0.75 MG/.5ML
0.75 INJECTION, SOLUTION SUBCUTANEOUS
Qty: 1 | Refills: 1 | Status: DISCONTINUED | COMMUNITY
Start: 2023-02-20 | End: 2023-03-28

## 2023-03-28 NOTE — ASSESSMENT
[FreeTextEntry1] : 63 y/o obese female with uncontrolled type 2 diabetes, Hypercalcemia, Hyperlipidemia, HTN and Thyroid nodules. \par \par Plan: \par Type 2 DM: improving control, A1c improving 6.4% in 2/23\par - continue  Mounjaro 5 mg , metformin, glipizide, tresiba \par - continue self blood sugar monitoring  2 to 3 times a day\par - repeat A1c before next visit\par -LDL: 115, not at goal.\par -mild albuminuria, not on ACE/ARB, will start lisinopril 5 mg daily, will repeat next visit.\par \par Obesity: encouraged to increase routine exercise \par - educated on healthy food choices\par - continue Mounjaro\par \par Hypercalcemia:  PTH and ionized calcium are elevated. recent serum calcium mildly high 10.5.PTH normal.\par  24 hour urine calcium and creatinine was normal. check Parathyroid scan patient declined test due to not covered by insurance, continue to Sensipar\par - Discussed surgery, pt. declines having surgery but will think about it.\par -Since calcium not very high, 24 hr urine not high, Forearm BMD normal, we can watch for now.\par \par Osteopenia: DEXA in 6/21- Osteopenia, continue vitamin D, may benefit from starting Fosamax .\par Repeat DXA in 6/22 showed normal BMD in forearm which is reassuring.\par next DEXA due 6/2024\par \par Hyperlipidemia: continue statin \par - not at goal\par -To go up on statin\par - repeat lipids fasting before next visit \par \par HTN: BP acceptable, continue current medication regimen  \par \par Thyroid nodules: stable thyroid nodules, repeat thyroid sonogram  this visit.orders placed.\par \par RTO in 6 months or sooner

## 2023-03-28 NOTE — HISTORY OF PRESENT ILLNESS
[FreeTextEntry1] : Sarai is a 62 yr old female, here for DM type 2 , PHPT, seen NP in last, seeing me first time.\par \par History per NP note: ''Pt. reports A1C has been rising due to stress with . Her  almost lost his leg to diabetes.\par she agrees to taking a GLP 1 agonist. speaks about how she overeats when stressed out, reports that mother has some form of thyroid cancer, denies pancreatitis \par we continue to recommend parathyroid scan due to high calcium, states that she is taking b12 to help with calcium \par states that she is willing to accept fosamax for osteopenia .\par Blood sugars improving on Mounjaro.''\par \par \par denies being in the hospital or having any acute illness. \par Quality: Type 2 DM\par Severity: uncontrolled \par Duration: over 5 years \par Onset: routine labs\par Modifying Factors: Worse with diet \par Family History: Mother - diabetes,  thyroid cancer, Brother- diabetes \par \par Current Regimen:\par Metformin 1000 mg 2 tabs daily \par Glipizide 5 mg BID \par Tresiba 8 units in am\par Mounjaro 5 mg weekly\par \par Ozempic 0.5 mg weekly - discontinued due to symptoms, vomiting \par \par Fingerstick in clinic: 143\par a1c: 6.4% in \par \par Self blood sugar monitorin times a day, \par per logs\par fastins to 120s\par evenings: 120s to 150s\par \par exercise: home motion exercises\par \par Weight: loss 13 pounds \par \par Diet: no more fast food\par B- corn muffin, cereal\par L- cottage cheese, turkey, fruit\par D- grilled chicken, vegetable, fruits\par Snacks - sugar free cookies \par \par \par Date of last eye exam:  (-) \par Date of last foot exam:  with podiatry, denies neuropathy \par Date of last flu vaccine: \par Date of last Pneumovax: last couple of years \par Seen Cardiology - no heart disease\par \par \par Has h/o thyroid nodules, being monitored on US. Last US was in ,\par \par Vitamin D: takes 5,000 units daily \par Vitamin B12: takes 1000 units daily \par \par PMH: \par Retired \par obesity\par arthritis \par \par PSH: \par neck surgery due to herniated disc 2019\par left breast cyst\par left ovary cyst\par

## 2023-03-28 NOTE — REASON FOR VISIT
[Follow - Up] : a follow-up visit [DM Type 2] : DM Type 2 [Other___] : [unfilled] [Hypercalcemia] : hypercalcemia [Thyroid nodule/ MNG] : thyroid nodule/ MNG

## 2023-03-28 NOTE — PHYSICAL EXAM
[Alert] : alert [Obese] : obese [No Acute Distress] : no acute distress [Normal Sclera/Conjunctiva] : normal sclera/conjunctiva [No Proptosis] : no proptosis [Normal Hearing] : hearing was normal [No LAD] : no lymphadenopathy [Thyroid Not Enlarged] : the thyroid was not enlarged [No Thyroid Nodules] : no palpable thyroid nodules [No Respiratory Distress] : no respiratory distress [No Accessory Muscle Use] : no accessory muscle use [Normal Rate and Effort] : normal respiratory rate and effort [Clear to Auscultation] : lungs were clear to auscultation bilaterally [Normal S1, S2] : normal S1 and S2 [Normal Rate] : heart rate was normal [Regular Rhythm] : with a regular rhythm [No Edema] : no peripheral edema [Normal Gait] : normal gait [No Rash] : no rash [No Tremors] : no tremors [Oriented x3] : oriented to person, place, and time [Normal Affect] : the affect was normal [Normal Insight/Judgement] : insight and judgment were intact [Normal Mood] : the mood was normal [Acanthosis Nigricans] : no acanthosis nigricans [de-identified] : Pt. declined foot exam

## 2023-03-28 NOTE — REVIEW OF SYSTEMS
[Decreased Appetite] : decreased appetite [Headaches] : headaches [Recent Weight Loss (___ Lbs)] : recent weight loss: [unfilled] lbs [Fatigue] : no fatigue [Visual Field Defect] : no visual field defect [Blurred Vision] : no blurred vision [Dysphagia] : no dysphagia [Neck Pain] : no neck pain [Dysphonia] : no dysphonia [Chest Pain] : no chest pain [Palpitations] : no palpitations [Constipation] : no constipation [Diarrhea] : no diarrhea [Polyuria] : no polyuria [Dysuria] : no dysuria [Dizziness] : no dizziness [Tremors] : no tremors [Depression] : no depression [Insomnia] : no insomnia [Anxiety] : no anxiety [Polydipsia] : no polydipsia [Cold Intolerance] : no cold intolerance [Heat Intolerance] : no heat intolerance [Easy Bleeding] : no ~M tendency for easy bleeding [Easy Bruising] : no tendency for easy bruising [Swelling] : no swelling [de-identified] : with the rainy weather

## 2023-04-18 ENCOUNTER — RX RENEWAL (OUTPATIENT)
Age: 63
End: 2023-04-18

## 2023-05-01 RX ORDER — LOVASTATIN 20 MG/1
20 TABLET ORAL DAILY
Qty: 90 | Refills: 1 | Status: ACTIVE | COMMUNITY
Start: 2022-08-19 | End: 1900-01-01

## 2023-05-15 ENCOUNTER — RX RENEWAL (OUTPATIENT)
Age: 63
End: 2023-05-15

## 2023-05-26 ENCOUNTER — NON-APPOINTMENT (OUTPATIENT)
Age: 63
End: 2023-05-26

## 2023-06-21 ENCOUNTER — APPOINTMENT (OUTPATIENT)
Dept: DERMATOLOGY | Facility: CLINIC | Age: 63
End: 2023-06-21
Payer: MEDICARE

## 2023-06-21 PROCEDURE — 99204 OFFICE O/P NEW MOD 45 MIN: CPT

## 2023-07-28 RX ORDER — CINACALCET 30 MG/1
30 TABLET ORAL
Qty: 90 | Refills: 0 | Status: ACTIVE | COMMUNITY
Start: 2022-10-28 | End: 1900-01-01

## 2023-09-11 ENCOUNTER — APPOINTMENT (OUTPATIENT)
Dept: ORTHOPEDIC SURGERY | Facility: CLINIC | Age: 63
End: 2023-09-11
Payer: MEDICARE

## 2023-09-11 VITALS — WEIGHT: 277 LBS | BODY MASS INDEX: 47.29 KG/M2 | HEIGHT: 64 IN

## 2023-09-11 DIAGNOSIS — M54.50 LOW BACK PAIN, UNSPECIFIED: ICD-10-CM

## 2023-09-11 DIAGNOSIS — M48.061 SPINAL STENOSIS, LUMBAR REGION WITHOUT NEUROGENIC CLAUDICATION: ICD-10-CM

## 2023-09-11 DIAGNOSIS — G89.29 LOW BACK PAIN, UNSPECIFIED: ICD-10-CM

## 2023-09-11 DIAGNOSIS — M51.36 OTHER INTERVERTEBRAL DISC DEGENERATION, LUMBAR REGION: ICD-10-CM

## 2023-09-11 PROCEDURE — 99213 OFFICE O/P EST LOW 20 MIN: CPT

## 2023-09-13 ENCOUNTER — APPOINTMENT (OUTPATIENT)
Dept: ENDOCRINOLOGY | Facility: CLINIC | Age: 63
End: 2023-09-13

## 2024-02-22 ENCOUNTER — APPOINTMENT (OUTPATIENT)
Dept: UROLOGY | Facility: CLINIC | Age: 64
End: 2024-02-22
Payer: MEDICARE

## 2024-02-22 VITALS
HEIGHT: 64 IN | HEART RATE: 91 BPM | SYSTOLIC BLOOD PRESSURE: 130 MMHG | TEMPERATURE: 98.7 F | WEIGHT: 260 LBS | OXYGEN SATURATION: 98 % | BODY MASS INDEX: 44.39 KG/M2 | DIASTOLIC BLOOD PRESSURE: 80 MMHG

## 2024-02-22 DIAGNOSIS — R79.89 OTHER SPECIFIED ABNORMAL FINDINGS OF BLOOD CHEMISTRY: ICD-10-CM

## 2024-02-22 PROCEDURE — 51798 US URINE CAPACITY MEASURE: CPT

## 2024-02-22 PROCEDURE — 51701 INSERT BLADDER CATHETER: CPT

## 2024-02-22 PROCEDURE — 99205 OFFICE O/P NEW HI 60 MIN: CPT | Mod: 25

## 2024-02-22 RX ORDER — TIRZEPATIDE 5 MG/.5ML
5 INJECTION, SOLUTION SUBCUTANEOUS
Qty: 4 | Refills: 1 | Status: DISCONTINUED | COMMUNITY
Start: 2022-10-24 | End: 2024-02-22

## 2024-02-22 RX ORDER — HYDROCHLOROTHIAZIDE 25 MG/1
25 TABLET ORAL DAILY
Refills: 0 | Status: DISCONTINUED | COMMUNITY
End: 2024-02-22

## 2024-02-22 RX ORDER — PEN NEEDLE, DIABETIC 32GX 5/32"
32G X 4 MM NEEDLE, DISPOSABLE MISCELLANEOUS
Qty: 30 | Refills: 0 | Status: ACTIVE | COMMUNITY
Start: 2022-01-14 | End: 1900-01-01

## 2024-02-22 NOTE — ASSESSMENT
[FreeTextEntry1] : 63F frequent UTI versus asymptomatic bacteriuria.  Also seen to have an elevated creatinine in December 2023  -Outside record and lab results reviewed including CBC and BMP -UA and urine culture sent from straight cath specimen -PVR performed with residual of 0 -Will consider methenamine if patient truly has frequent UTIs -Avoid constipation -Stay well-hydrated -Minimize bladder irritants including caffeine, carbonated beverages, spicy food, and alcohol -Patient noted to have an elevated serum creatinine on PCP labs from December 2023; will recheck serum creatinine level today along with electrolytes    Jese Jefferson MD Chief of Urology, [address], [address] [place] [address] Phone: [phone] Fax: [phone]

## 2024-02-22 NOTE — PHYSICAL EXAM
[General Appearance - Well Developed] : well developed [General Appearance - Well Nourished] : well nourished [Normal Appearance] : normal appearance [Well Groomed] : well groomed [Edema] : no peripheral edema [General Appearance - In No Acute Distress] : no acute distress [Respiration, Rhythm And Depth] : normal respiratory rhythm and effort [Exaggerated Use Of Accessory Muscles For Inspiration] : no accessory muscle use [Abdomen Soft] : soft [Abdomen Tenderness] : non-tender [Costovertebral Angle Tenderness] : no ~M costovertebral angle tenderness [Urinary Bladder Findings] : the bladder was normal on palpation [Urethral Meatus] : the meatus of the urethra showed no abnormalities [External Female Genitalia] : normal external genitalia [Skin Color & Pigmentation] : normal skin color and pigmentation [Normal Station and Gait] : the gait and station were normal for the patient's age [] : no rash [Skin Turgor] : supple [No Focal Deficits] : no focal deficits [Oriented To Time, Place, And Person] : oriented to person, place, and time [Affect] : the affect was normal [Mood] : the mood was normal [de-identified] : Mild vaginal atrophy, retracted urethra, no prolapse noted no pain with palpation of vaginal vault [No Palpable Adenopathy] : no palpable adenopathy

## 2024-02-26 LAB
ANION GAP SERPL CALC-SCNC: 12 MMOL/L
APPEARANCE: ABNORMAL
BACTERIA UR CULT: ABNORMAL
BACTERIA: ABNORMAL /HPF
BILIRUBIN URINE: NEGATIVE
BLOOD URINE: ABNORMAL
BUN SERPL-MCNC: 19 MG/DL
CALCIUM SERPL-MCNC: 12 MG/DL
CAST: 1 /LPF
CHLORIDE SERPL-SCNC: 108 MMOL/L
CO2 SERPL-SCNC: 20 MMOL/L
COLOR: YELLOW
CREAT SERPL-MCNC: 1.07 MG/DL
EGFR: 58 ML/MIN/1.73M2
EPITHELIAL CELLS: 0 /HPF
GLUCOSE QUALITATIVE U: NEGATIVE MG/DL
GLUCOSE SERPL-MCNC: 171 MG/DL
HYALINE CASTS: PRESENT
KETONES URINE: NEGATIVE MG/DL
LEUKOCYTE ESTERASE URINE: ABNORMAL
MICROSCOPIC-UA: NORMAL
NITRITE URINE: POSITIVE
PH URINE: 5.5
POTASSIUM SERPL-SCNC: 4.5 MMOL/L
PROTEIN URINE: 30 MG/DL
RED BLOOD CELLS URINE: 2 /HPF
REVIEW: NORMAL
SODIUM SERPL-SCNC: 140 MMOL/L
SPECIFIC GRAVITY URINE: 1.02
UROBILINOGEN URINE: 0.2 MG/DL
WBC CLUMPS: PRESENT
WHITE BLOOD CELLS URINE: 467 /HPF

## 2024-03-11 LAB
APPEARANCE: ABNORMAL
BACTERIA UR CULT: ABNORMAL
BACTERIA: ABNORMAL /HPF
BILIRUBIN URINE: NEGATIVE
BLOOD URINE: ABNORMAL
CAST: 2 /LPF
COLOR: YELLOW
EPITHELIAL CELLS: 2 /HPF
GLUCOSE QUALITATIVE U: 500 MG/DL
KETONES URINE: NEGATIVE MG/DL
LEUKOCYTE ESTERASE URINE: ABNORMAL
MICROSCOPIC-UA: NORMAL
NITRITE URINE: NEGATIVE
PH URINE: 6
PROTEIN URINE: 30 MG/DL
RED BLOOD CELLS URINE: 8 /HPF
REVIEW: NORMAL
SPECIFIC GRAVITY URINE: 1.02
UROBILINOGEN URINE: 0.2 MG/DL
WHITE BLOOD CELLS URINE: 894 /HPF

## 2024-03-18 ENCOUNTER — APPOINTMENT (OUTPATIENT)
Dept: ENDOCRINOLOGY | Facility: CLINIC | Age: 64
End: 2024-03-18
Payer: MEDICARE

## 2024-03-18 VITALS
DIASTOLIC BLOOD PRESSURE: 80 MMHG | HEIGHT: 64 IN | BODY MASS INDEX: 46.95 KG/M2 | HEART RATE: 84 BPM | OXYGEN SATURATION: 98 % | WEIGHT: 275 LBS | SYSTOLIC BLOOD PRESSURE: 140 MMHG

## 2024-03-18 DIAGNOSIS — E88.9 TYPE 2 DIABETES MELLITUS WITH OTHER SPECIFIED COMPLICATION: ICD-10-CM

## 2024-03-18 DIAGNOSIS — E04.1 NONTOXIC SINGLE THYROID NODULE: ICD-10-CM

## 2024-03-18 DIAGNOSIS — E11.69 TYPE 2 DIABETES MELLITUS WITH OTHER SPECIFIED COMPLICATION: ICD-10-CM

## 2024-03-18 DIAGNOSIS — E66.01 MORBID (SEVERE) OBESITY DUE TO EXCESS CALORIES: ICD-10-CM

## 2024-03-18 DIAGNOSIS — I10 ESSENTIAL (PRIMARY) HYPERTENSION: ICD-10-CM

## 2024-03-18 DIAGNOSIS — E83.52 HYPERCALCEMIA: ICD-10-CM

## 2024-03-18 DIAGNOSIS — E78.5 HYPERLIPIDEMIA, UNSPECIFIED: ICD-10-CM

## 2024-03-18 LAB
GLUCOSE BLDC GLUCOMTR-MCNC: 180
HBA1C MFR BLD HPLC: 6.2

## 2024-03-18 PROCEDURE — G2211 COMPLEX E/M VISIT ADD ON: CPT

## 2024-03-18 PROCEDURE — 82962 GLUCOSE BLOOD TEST: CPT

## 2024-03-18 PROCEDURE — 99214 OFFICE O/P EST MOD 30 MIN: CPT

## 2024-03-18 RX ORDER — TIRZEPATIDE 2.5 MG/.5ML
2.5 INJECTION, SOLUTION SUBCUTANEOUS
Qty: 1 | Refills: 0 | Status: ACTIVE | COMMUNITY
Start: 2024-03-18 | End: 1900-01-01

## 2024-03-18 RX ORDER — FLASH GLUCOSE SENSOR
KIT MISCELLANEOUS
Qty: 2 | Refills: 2 | Status: DISCONTINUED | COMMUNITY
Start: 2022-10-24 | End: 2024-03-18

## 2024-03-18 RX ORDER — INSULIN DEGLUDEC INJECTION 100 U/ML
100 INJECTION, SOLUTION SUBCUTANEOUS
Qty: 3 | Refills: 0 | Status: ACTIVE | COMMUNITY
Start: 2021-05-24 | End: 1900-01-01

## 2024-03-18 NOTE — ASSESSMENT
[Diabetes Foot Care] : diabetes foot care [Long Term Vascular Complications] : long term vascular complications of diabetes [Importance of Diet and Exercise] : importance of diet and exercise to improve glycemic control, achieve weight loss and improve cardiovascular health [Carbohydrate Consistent Diet] : carbohydrate consistent diet [Exercise/Effect on Glucose] : exercise/effect on glucose [Retinopathy Screening] : Patient was referred to ophthalmology for retinopathy screening [FreeTextEntry1] : 62 y/o obese female with uncontrolled type 2 diabetes, Hypercalcemia, Hyperlipidemia, HTN and Thyroid nodules.   Plan: Type 2 DM: improving control, A1c improving 6.3% today in office - continue, metformin, glipizide, tresiba -Will restart Mounjaro 2.5 mg weekly, if covered by insurance, if gets medication can stop glipizide - continue self blood sugar monitoring 2 to 3 times a day - repeat A1c before next visit -LDL: 89, continue statin  -continue  lisinopril 5 mg daily, will repeat next visit.    Obesity: encouraged to increase routine exercise - educated on healthy food choices - restart Mounjaro    Hypercalcemia: PTH and ionized calcium are elevated. recent serum calcium mildly high 10.5.PTH normal.  24 hour urine calcium and creatinine was normal. check Parathyroid scan patient declined test due to not covered by insurance, continue to Sensipar, she feels like it does not work   - Discussed surgery, pt. declines having surgery but will think about it. -Since calcium not very high, 24 hr urine not high, Forearm BMD normal, we can watch for now.    Osteopenia: DEXA in 6/21- Osteopenia, continue vitamin D, may benefit from starting Fosamax. Repeat DXA in 6/22 showed normal BMD in forearm which is reassuring. next DEXA due 6/2024, rx given    Hyperlipidemia: continue statin -LDL 89 -To go up on statin - repeat lipids fasting before next visit    HTN: BP acceptable, continue current medication regimen    Thyroid nodules: stable thyroid nodules, repeat thyroid sonogram     RTO in 3 months MD

## 2024-03-18 NOTE — REVIEW OF SYSTEMS
[Fatigue] : no fatigue [Visual Field Defect] : no visual field defect [Dysphagia] : no dysphagia [Chest Pain] : no chest pain [Palpitations] : no palpitations [Shortness Of Breath] : no shortness of breath [Nausea] : no nausea [Constipation] : no constipation [Vomiting] : no vomiting [Diarrhea] : no diarrhea [Polyuria] : no polyuria

## 2024-03-18 NOTE — PHYSICAL EXAM
[Alert] : alert [Normal Sclera/Conjunctiva] : normal sclera/conjunctiva [Normal Hearing] : hearing was normal [Thyroid Not Enlarged] : the thyroid was not enlarged [No Neck Mass] : no neck mass was observed [No Respiratory Distress] : no respiratory distress [Clear to Auscultation] : lungs were clear to auscultation bilaterally [No Accessory Muscle Use] : no accessory muscle use [Normal S1, S2] : normal S1 and S2 [No Stigmata of Cushings Syndrome] : no stigmata of Cushings Syndrome [Normal Gait] : normal gait [Oriented x3] : oriented to person, place, and time

## 2024-03-18 NOTE — REASON FOR VISIT
[Follow - Up] : a follow-up visit [DM Type 2] : DM Type 2 [Hypocalcemia] : hypocalcemia [Other___] : [unfilled] [Thyroid nodule/ MNG] : thyroid nodule/ MNG

## 2024-03-25 LAB — BACTERIA UR CULT: NORMAL

## 2024-03-25 RX ORDER — AMOXICILLIN AND CLAVULANATE POTASSIUM 875; 125 MG/1; MG/1
875-125 TABLET, COATED ORAL
Qty: 14 | Refills: 0 | Status: DISCONTINUED | COMMUNITY
Start: 2024-02-23 | End: 2024-03-25

## 2024-03-25 RX ORDER — SULFAMETHOXAZOLE AND TRIMETHOPRIM 800; 160 MG/1; MG/1
800-160 TABLET ORAL
Qty: 14 | Refills: 0 | Status: DISCONTINUED | COMMUNITY
Start: 2024-03-11 | End: 2024-03-25

## 2024-04-22 ENCOUNTER — APPOINTMENT (OUTPATIENT)
Dept: UROLOGY | Facility: CLINIC | Age: 64
End: 2024-04-22
Payer: MEDICARE

## 2024-04-22 VITALS
WEIGHT: 260 LBS | TEMPERATURE: 97.5 F | BODY MASS INDEX: 44.39 KG/M2 | SYSTOLIC BLOOD PRESSURE: 130 MMHG | OXYGEN SATURATION: 98 % | HEART RATE: 86 BPM | HEIGHT: 64 IN | DIASTOLIC BLOOD PRESSURE: 80 MMHG

## 2024-04-22 DIAGNOSIS — N39.0 URINARY TRACT INFECTION, SITE NOT SPECIFIED: ICD-10-CM

## 2024-04-22 LAB
BILIRUB UR QL STRIP: NEGATIVE
CLARITY UR: CLEAR
COLLECTION METHOD: NORMAL
GLUCOSE UR-MCNC: NEGATIVE
HCG UR QL: 0.2 EU/DL
HGB UR QL STRIP.AUTO: ABNORMAL
KETONES UR-MCNC: NEGATIVE
LEUKOCYTE ESTERASE UR QL STRIP: ABNORMAL
NITRITE UR QL STRIP: POSITIVE
PH UR STRIP: 5.5
PROT UR STRIP-MCNC: ABNORMAL
SP GR UR STRIP: 1.02

## 2024-04-22 PROCEDURE — 99214 OFFICE O/P EST MOD 30 MIN: CPT

## 2024-04-22 RX ORDER — MULTIVIT-MIN/IRON/FOLIC ACID/K 18-600-40
CAPSULE ORAL
Refills: 0 | Status: ACTIVE | COMMUNITY

## 2024-04-22 NOTE — PHYSICAL EXAM
[General Appearance - Well Developed] : well developed [General Appearance - Well Nourished] : well nourished [Normal Appearance] : normal appearance [Well Groomed] : well groomed [General Appearance - In No Acute Distress] : no acute distress [Edema] : no peripheral edema [Respiration, Rhythm And Depth] : normal respiratory rhythm and effort [Exaggerated Use Of Accessory Muscles For Inspiration] : no accessory muscle use [Abdomen Soft] : soft [Abdomen Tenderness] : non-tender [Normal Station and Gait] : the gait and station were normal for the patient's age [Skin Color & Pigmentation] : normal skin color and pigmentation [Skin Turgor] : supple [] : no rash [No Focal Deficits] : no focal deficits [Oriented To Time, Place, And Person] : oriented to person, place, and time [Affect] : the affect was normal [Mood] : the mood was normal [Not Anxious] : not anxious

## 2024-04-22 NOTE — ASSESSMENT
[FreeTextEntry1] : 63F frequent UTI versus asymptomatic bacteriuria. Also seen to have an elevated creatinine in December 2023  -Outside record and lab results reviewed including CBC and BMP -UA/Ucx sent again today; will not treat unless patient has true symptoms -PVR today = 79 cc -Continue methenamine +500 mg vitamin C  -Avoid constipation -Stay well-hydrated -Minimize bladder irritants including caffeine, carbonated beverages, spicy food, and alcohol -SCr has normalized    Jese Jefferson MD Chief of Urology, [address], [facility] [address] [place] [address] Phone: [phone] Fax: [phone]

## 2024-04-22 NOTE — HISTORY OF PRESENT ILLNESS
[FreeTextEntry1] : Referring Provider: Self Chief Complaint: Frequent UTI versus asymptomatic bacteriuria Date of first visit: 2024  DEYANIRA PRUITT is a 63 year old  woman with a PMHx of HTN, HLD, DM2 who presents for evaluation of frequent UTI versus asymptomatic bacteriuria. Patient states that she has approximately 2 UTIs a year though unsure if there are culture proven. She states that she is asymptomatic and usually does not note that she has UTI other than the fact that she correlates an uncontrolled blood sugar with UTI. She denies gross hematuria urinary frequency urgency foul-smelling or discolored urine. No history of kidney stones. Overall happy with her voiding symptoms. Denies constipation. Patient provided notes from PCP though no evidence of positive culture seen in the records provided. She was noted to have an elevated serum creatinine in 2023.  Since her initial visit patient has been treated for a UTI and started on methenamine.  She states that she has not had any episodes of rabia UTI symptoms.  At times she feels like her blood sugar is elevated but she is not sure if this truly correlates to UTI.  She denies any burning with urination or change in frequency.  PVR Hx  - 2024 = 79 cc - 2024 = 0 cc   PMHx: HLD, HTN, DM2 SxHx: Left breast and unilateral ovarian cystectomy in her 20s,  (), cervical spine surgery () FHx: No  related malignancies. Mother w/ thyroid cancer father w/ skin cancer SocHx: Never smoker, no alcohol use,  with 1 child Allergies: Cinnamon, peanuts and tree nuts  The patient denies fevers, chills, nausea and or vomiting and no unexplained weight loss. All pertinent laboratory, films and physician notes were reviewed.

## 2024-04-23 LAB
APPEARANCE: ABNORMAL
BACTERIA: ABNORMAL /HPF
BILIRUBIN URINE: NEGATIVE
BLOOD URINE: NEGATIVE
CAST: 0 /LPF
COLOR: YELLOW
EPITHELIAL CELLS: 0 /HPF
GLUCOSE QUALITATIVE U: NEGATIVE MG/DL
KETONES URINE: NEGATIVE MG/DL
LEUKOCYTE ESTERASE URINE: ABNORMAL
MICROSCOPIC-UA: NORMAL
NITRITE URINE: POSITIVE
PH URINE: 6
PROTEIN URINE: NORMAL MG/DL
RED BLOOD CELLS URINE: 3 /HPF
SPECIFIC GRAVITY URINE: 1.02
UROBILINOGEN URINE: 0.2 MG/DL
WHITE BLOOD CELLS URINE: 168 /HPF

## 2024-04-25 ENCOUNTER — NON-APPOINTMENT (OUTPATIENT)
Age: 64
End: 2024-04-25

## 2024-04-26 LAB — BACTERIA UR CULT: ABNORMAL

## 2024-05-22 ENCOUNTER — APPOINTMENT (OUTPATIENT)
Dept: UROLOGY | Facility: CLINIC | Age: 64
End: 2024-05-22
Payer: MEDICARE

## 2024-05-22 VITALS
DIASTOLIC BLOOD PRESSURE: 80 MMHG | TEMPERATURE: 97.7 F | SYSTOLIC BLOOD PRESSURE: 140 MMHG | OXYGEN SATURATION: 100 % | BODY MASS INDEX: 44.39 KG/M2 | WEIGHT: 260 LBS | HEART RATE: 98 BPM | HEIGHT: 64 IN

## 2024-05-22 DIAGNOSIS — R82.71 BACTERIURIA: ICD-10-CM

## 2024-05-22 DIAGNOSIS — N95.2 POSTMENOPAUSAL ATROPHIC VAGINITIS: ICD-10-CM

## 2024-05-22 PROCEDURE — 99214 OFFICE O/P EST MOD 30 MIN: CPT | Mod: 25

## 2024-05-22 PROCEDURE — A4216: CPT | Mod: NC

## 2024-05-22 PROCEDURE — 51700 IRRIGATION OF BLADDER: CPT

## 2024-05-22 RX ORDER — METHENAMINE HIPPURATE 1 G/1
1 TABLET ORAL
Qty: 60 | Refills: 6 | Status: DISCONTINUED | COMMUNITY
Start: 2024-03-11 | End: 2024-05-22

## 2024-05-22 RX ORDER — ESTRADIOL 0.1 MG/G
0.1 CREAM VAGINAL
Qty: 1 | Refills: 3 | Status: ACTIVE | COMMUNITY
Start: 2024-05-22 | End: 1900-01-01

## 2024-05-22 NOTE — ASSESSMENT
[FreeTextEntry1] : 63F frequent UTI versus asymptomatic bacteriuria.  -Straight cath specimen urine culture sent -Postvoid residual today was minimal and obtained via straight cath -Bladder was instilled with NS and Betadine solution and rinsed to provide antisepsis -Discussed starting topical estrogen cream to help refresh the vaginal biome and thus decrease colonization -Avoid constipation -Stay well-hydrated -Minimize bladder irritants including caffeine, carbonated beverages, spicy food, and alcohol -SCr has normalized -Prescribed topical Estrogen cream - discussed application of a pea-sized amount to the area of the introitus three times per week. Discussed that topical estrogen use does not pose the risk that systemic hormone therapy poses. Pt has no personal history of breast, ovarian, or uterine malignancies. Pt understands and wishes to proceed with therapy.   Jese Jefferson MD Chief of Urology, [address], [facility] [address]tr[address] [place] [address] Phone: [phone] Fax: [phone]

## 2024-05-22 NOTE — PHYSICAL EXAM
[Normal Appearance] : normal appearance [Well Groomed] : well groomed [General Appearance - In No Acute Distress] : no acute distress [Edema] : no peripheral edema [Respiration, Rhythm And Depth] : normal respiratory rhythm and effort [Exaggerated Use Of Accessory Muscles For Inspiration] : no accessory muscle use [Abdomen Soft] : soft [Abdomen Tenderness] : non-tender [Costovertebral Angle Tenderness] : no ~M costovertebral angle tenderness [Urinary Bladder Findings] : the bladder was normal on palpation [Normal Station and Gait] : the gait and station were normal for the patient's age [] : no rash [No Focal Deficits] : no focal deficits [Oriented To Time, Place, And Person] : oriented to person, place, and time [Affect] : the affect was normal [Mood] : the mood was normal [No Palpable Adenopathy] : no palpable adenopathy [General Appearance - Well Developed] : well developed [General Appearance - Well Nourished] : well nourished [Skin Color & Pigmentation] : normal skin color and pigmentation [de-identified] : Retracted urethral meatus, mild vaginal atrophy, no urethral vaginal discharge, no prolapse

## 2024-05-22 NOTE — HISTORY OF PRESENT ILLNESS
[Nocturia] : nocturia [FreeTextEntry1] : Referring Provider: Self Chief Complaint: Frequent UTI versus asymptomatic bacteriuria Date of first visit: 2024  DEYANIRA PRUITT is a 63 year old  woman with a PMHx of HTN, HLD, DM2 who presents for evaluation of frequent UTI versus asymptomatic bacteriuria. Patient states that she has approximately 2 UTIs a year though unsure if there are culture proven. She states that she is asymptomatic and usually does not note that she has UTI other than the fact that she correlates an uncontrolled blood sugar with UTI. She denies gross hematuria urinary frequency urgency foul-smelling or discolored urine. No history of kidney stones. Overall happy with her voiding symptoms. Denies constipation. Patient provided notes from PCP though no evidence of positive culture seen in the records provided. She was noted to have an elevated serum creatinine in 2023.  Since her initial visit patient has been treated for a UTI and started on methenamine. She states that she has not had any episodes of rabia UTI symptoms. At times she feels like her blood sugar is elevated but she is not sure if this truly correlates to UTI. She denies any burning with urination or change in frequency.  24: Since her last follow-up appointment, patient has not had a symptomatic urinary tract infection.  She does note that she is very nervous about developing infections.  She has arrived with her voiding diary notable results are that she awakes at 3 AM and drinks 1 cup of coffee.  She states that she does not sleep well and is usually starting her day at 3 AM.  Urine culture from last visit was ESBL E. coli and remained untreated as the patient was without symptoms.  She self DC'd her methenamine and vitamin C as she felt that it gave her urinary frequency.  PVR Hx - 2024 = 79 cc - 2024 = 0 cc  PMHx: HLD, HTN, DM2 SxHx: Left breast and unilateral ovarian cystectomy in her 20s,  (), cervical spine surgery () FHx: No  related malignancies. Mother w/ thyroid cancer father w/ skin cancer SocHx: Never smoker, no alcohol use,  with 1 child Allergies: Cinnamon, peanuts and tree nuts  The patient denies fevers, chills, nausea and or vomiting and no unexplained weight loss. All pertinent laboratory, films and physician notes were reviewed. [Dysuria] : no dysuria

## 2024-06-03 LAB — BACTERIA UR CULT: ABNORMAL

## 2024-06-24 ENCOUNTER — RX RENEWAL (OUTPATIENT)
Age: 64
End: 2024-06-24

## 2024-06-24 DIAGNOSIS — E11.9 TYPE 2 DIABETES MELLITUS W/OUT COMPLICATIONS: ICD-10-CM

## 2024-06-24 RX ORDER — PEN NEEDLE, DIABETIC 32GX 5/32"
32G X 4 MM NEEDLE, DISPOSABLE MISCELLANEOUS
Qty: 100 | Refills: 1 | Status: ACTIVE | COMMUNITY
Start: 2024-06-24 | End: 1900-01-01

## 2024-06-28 ENCOUNTER — NON-APPOINTMENT (OUTPATIENT)
Age: 64
End: 2024-06-28

## 2024-07-17 ENCOUNTER — NON-APPOINTMENT (OUTPATIENT)
Age: 64
End: 2024-07-17

## 2024-08-16 ENCOUNTER — APPOINTMENT (OUTPATIENT)
Dept: ENDOCRINOLOGY | Facility: CLINIC | Age: 64
End: 2024-08-16

## 2024-09-09 ENCOUNTER — APPOINTMENT (OUTPATIENT)
Dept: UROLOGY | Facility: CLINIC | Age: 64
End: 2024-09-09

## 2024-09-10 NOTE — ASSESSMENT
[FreeTextEntry1] : 63F frequent UTI versus asymptomatic bacteriuria.  -Straight cath specimen urine culture sent -Postvoid residual today was minimal and obtained via straight cath -Bladder was instilled with NS and Betadine solution and rinsed to provide antisepsis -Discussed starting topical estrogen cream to help refresh the vaginal biome and thus decrease colonization -Avoid constipation -Stay well-hydrated -Minimize bladder irritants including caffeine, carbonated beverages, spicy food, and alcohol -SCr has normalized -Prescribed topical Estrogen cream - discussed application of a pea-sized amount to the area of the introitus three times per week. Discussed that topical estrogen use does not pose the risk that systemic hormone therapy poses. Pt has no personal history of breast, ovarian, or uterine malignancies. Pt understands and wishes to proceed with therapy.   Jese Jefferson MD Chief of Urology, [address], [facility] [address] [place] [address] Phone: [phone] Fax: [phone]

## 2024-09-10 NOTE — ASSESSMENT
[FreeTextEntry1] : 63F frequent UTI versus asymptomatic bacteriuria.  -Straight cath specimen urine culture sent -Postvoid residual today was minimal and obtained via straight cath -Bladder was instilled with NS and Betadine solution and rinsed to provide antisepsis -Discussed starting topical estrogen cream to help refresh the vaginal biome and thus decrease colonization -Avoid constipation -Stay well-hydrated -Minimize bladder irritants including caffeine, carbonated beverages, spicy food, and alcohol -SCr has normalized -Prescribed topical Estrogen cream - discussed application of a pea-sized amount to the area of the introitus three times per week. Discussed that topical estrogen use does not pose the risk that systemic hormone therapy poses. Pt has no personal history of breast, ovarian, or uterine malignancies. Pt understands and wishes to proceed with therapy.   Jese Jefferson MD Chief of Urology, [address], [facility] [address] [place] [address] Phone: [phone] Fax: [phone]

## 2024-09-10 NOTE — HISTORY OF PRESENT ILLNESS
[FreeTextEntry1] : Referring Provider: Self Chief Complaint: Frequent UTI versus asymptomatic bacteriuria Date of first visit: 2024  DEYANIRA PRUITT is a 63 year old  woman with a PMHx of HTN, HLD, DM2 who presents for evaluation of frequent UTI versus asymptomatic bacteriuria. Patient states that she has approximately 2 UTIs a year though unsure if there are culture proven. She states that she is asymptomatic and usually does not note that she has UTI other than the fact that she correlates an uncontrolled blood sugar with UTI. She denies gross hematuria urinary frequency urgency foul-smelling or discolored urine. No history of kidney stones. Overall happy with her voiding symptoms. Denies constipation. Patient provided notes from PCP though no evidence of positive culture seen in the records provided. She was noted to have an elevated serum creatinine in 2023.  Since her initial visit patient has been treated for a UTI and started on methenamine. She states that she has not had any episodes of rabia UTI symptoms. At times she feels like her blood sugar is elevated but she is not sure if this truly correlates to UTI. She denies any burning with urination or change in frequency.  24: Since her last follow-up appointment, patient has not had a symptomatic urinary tract infection. She does note that she is very nervous about developing infections. She has arrived with her voiding diary notable results are that she awakes at 3 AM and drinks 1 cup of coffee. She states that she does not sleep well and is usually starting her day at 3 AM. Urine culture from last visit was ESBL E. coli and remained untreated as the patient was without symptoms. She self DC'd her methenamine and vitamin C as she felt that it gave her urinary frequency.  PVR Hx - 2024 = 79 cc - 2024 = 0 cc  PMHx: HLD, HTN, DM2 SxHx: Left breast and unilateral ovarian cystectomy in her 20s,  (), cervical spine surgery () FHx: No  related malignancies. Mother w/ thyroid cancer father w/ skin cancer SocHx: Never smoker, no alcohol use,  with 1 child Allergies: Cinnamon, peanuts and tree nuts  The patient denies fevers, chills, nausea and or vomiting and no unexplained weight loss. All pertinent laboratory, films and physician notes were reviewed.   Patient is currently experiencing nocturia, but no dysuria.

## 2024-12-06 ENCOUNTER — NON-APPOINTMENT (OUTPATIENT)
Age: 64
End: 2024-12-06

## 2025-05-28 LAB
HBA1C MFR BLD HPLC: 12.6
LDLC SERPL DIRECT ASSAY-MCNC: 117
MICROALBUMIN/CREAT 24H UR-RTO: 129
TSH SERPL-ACNC: 1.34

## 2025-05-29 ENCOUNTER — APPOINTMENT (OUTPATIENT)
Dept: ENDOCRINOLOGY | Facility: CLINIC | Age: 65
End: 2025-05-29
Payer: MEDICARE

## 2025-05-29 VITALS
SYSTOLIC BLOOD PRESSURE: 122 MMHG | DIASTOLIC BLOOD PRESSURE: 70 MMHG | WEIGHT: 261 LBS | BODY MASS INDEX: 44.56 KG/M2 | HEIGHT: 64 IN | OXYGEN SATURATION: 99 % | HEART RATE: 91 BPM

## 2025-05-29 DIAGNOSIS — E83.52 HYPERCALCEMIA: ICD-10-CM

## 2025-05-29 DIAGNOSIS — E66.01 MORBID (SEVERE) OBESITY DUE TO EXCESS CALORIES: ICD-10-CM

## 2025-05-29 DIAGNOSIS — E04.1 NONTOXIC SINGLE THYROID NODULE: ICD-10-CM

## 2025-05-29 DIAGNOSIS — E11.9 TYPE 2 DIABETES MELLITUS W/OUT COMPLICATIONS: ICD-10-CM

## 2025-05-29 DIAGNOSIS — E78.5 HYPERLIPIDEMIA, UNSPECIFIED: ICD-10-CM

## 2025-05-29 DIAGNOSIS — M85.80 OTHER SPECIFIED DISORDERS OF BONE DENSITY AND STRUCTURE, UNSPECIFIED SITE: ICD-10-CM

## 2025-05-29 LAB — GLUCOSE BLDC GLUCOMTR-MCNC: 307

## 2025-05-29 PROCEDURE — 99214 OFFICE O/P EST MOD 30 MIN: CPT

## 2025-05-29 PROCEDURE — 82962 GLUCOSE BLOOD TEST: CPT

## 2025-05-29 PROCEDURE — G2211 COMPLEX E/M VISIT ADD ON: CPT

## 2025-05-29 RX ORDER — TIRZEPATIDE 2.5 MG/.5ML
2.5 INJECTION, SOLUTION SUBCUTANEOUS
Qty: 1 | Refills: 0 | Status: ACTIVE | COMMUNITY
Start: 2025-05-29 | End: 1900-01-01

## 2025-05-29 RX ORDER — PEN NEEDLE, DIABETIC 29 G X1/2"
32G X 4 MM NEEDLE, DISPOSABLE MISCELLANEOUS
Qty: 1 | Refills: 1 | Status: ACTIVE | COMMUNITY
Start: 2025-05-29 | End: 1900-01-01

## 2025-06-20 ENCOUNTER — RX RENEWAL (OUTPATIENT)
Age: 65
End: 2025-06-20

## 2025-06-20 RX ORDER — TIRZEPATIDE 5 MG/.5ML
5 INJECTION, SOLUTION SUBCUTANEOUS
Qty: 3 | Refills: 1 | Status: ACTIVE | COMMUNITY
Start: 2025-06-20 | End: 1900-01-01

## 2025-07-03 ENCOUNTER — APPOINTMENT (OUTPATIENT)
Dept: ENDOCRINOLOGY | Facility: CLINIC | Age: 65
End: 2025-07-03
Payer: MEDICARE

## 2025-07-03 VITALS
HEIGHT: 64 IN | HEART RATE: 80 BPM | DIASTOLIC BLOOD PRESSURE: 78 MMHG | BODY MASS INDEX: 44.56 KG/M2 | WEIGHT: 261 LBS | SYSTOLIC BLOOD PRESSURE: 126 MMHG | OXYGEN SATURATION: 99 %

## 2025-07-03 LAB — GLUCOSE BLDC GLUCOMTR-MCNC: 113

## 2025-07-03 PROCEDURE — 99214 OFFICE O/P EST MOD 30 MIN: CPT

## 2025-07-03 PROCEDURE — 82962 GLUCOSE BLOOD TEST: CPT

## 2025-07-03 PROCEDURE — G2211 COMPLEX E/M VISIT ADD ON: CPT

## 2025-07-27 ENCOUNTER — NON-APPOINTMENT (OUTPATIENT)
Age: 65
End: 2025-07-27

## 2025-07-31 ENCOUNTER — APPOINTMENT (OUTPATIENT)
Dept: ORTHOPEDIC SURGERY | Facility: CLINIC | Age: 65
End: 2025-07-31
Payer: MEDICARE

## 2025-07-31 PROCEDURE — 99213 OFFICE O/P EST LOW 20 MIN: CPT

## 2025-07-31 PROCEDURE — 73562 X-RAY EXAM OF KNEE 3: CPT | Mod: LT

## 2025-08-08 ENCOUNTER — APPOINTMENT (OUTPATIENT)
Dept: ORTHOPEDIC SURGERY | Facility: CLINIC | Age: 65
End: 2025-08-08

## 2025-08-08 DIAGNOSIS — M17.12 UNILATERAL PRIMARY OSTEOARTHRITIS, LEFT KNEE: ICD-10-CM

## 2025-08-14 ENCOUNTER — APPOINTMENT (OUTPATIENT)
Dept: ENDOCRINOLOGY | Facility: CLINIC | Age: 65
End: 2025-08-14
Payer: MEDICARE

## 2025-08-14 VITALS
OXYGEN SATURATION: 99 % | DIASTOLIC BLOOD PRESSURE: 70 MMHG | HEART RATE: 76 BPM | SYSTOLIC BLOOD PRESSURE: 124 MMHG | HEIGHT: 64 IN | BODY MASS INDEX: 43.54 KG/M2 | WEIGHT: 255 LBS

## 2025-08-14 DIAGNOSIS — E83.52 HYPERCALCEMIA: ICD-10-CM

## 2025-08-14 DIAGNOSIS — E66.01 MORBID (SEVERE) OBESITY DUE TO EXCESS CALORIES: ICD-10-CM

## 2025-08-14 DIAGNOSIS — E78.5 HYPERLIPIDEMIA, UNSPECIFIED: ICD-10-CM

## 2025-08-14 DIAGNOSIS — E11.9 TYPE 2 DIABETES MELLITUS W/OUT COMPLICATIONS: ICD-10-CM

## 2025-08-14 DIAGNOSIS — I10 ESSENTIAL (PRIMARY) HYPERTENSION: ICD-10-CM

## 2025-08-14 LAB
GLUCOSE BLDC GLUCOMTR-MCNC: 120
HBA1C MFR BLD HPLC: 6.6

## 2025-08-14 PROCEDURE — 82962 GLUCOSE BLOOD TEST: CPT

## 2025-08-14 PROCEDURE — 99214 OFFICE O/P EST MOD 30 MIN: CPT

## 2025-08-14 PROCEDURE — G2211 COMPLEX E/M VISIT ADD ON: CPT
